# Patient Record
Sex: FEMALE | Race: BLACK OR AFRICAN AMERICAN
[De-identification: names, ages, dates, MRNs, and addresses within clinical notes are randomized per-mention and may not be internally consistent; named-entity substitution may affect disease eponyms.]

---

## 2017-10-05 ENCOUNTER — HOSPITAL ENCOUNTER (EMERGENCY)
Dept: HOSPITAL 18 - NAV ERS | Age: 37
Discharge: HOME | End: 2017-10-05
Payer: COMMERCIAL

## 2017-10-05 DIAGNOSIS — J06.9: Primary | ICD-10-CM

## 2017-10-05 DIAGNOSIS — F32.9: ICD-10-CM

## 2017-10-05 DIAGNOSIS — Z79.899: ICD-10-CM

## 2017-10-05 DIAGNOSIS — I10: ICD-10-CM

## 2017-10-05 DIAGNOSIS — E66.9: ICD-10-CM

## 2017-10-05 DIAGNOSIS — F41.9: ICD-10-CM

## 2017-10-05 PROCEDURE — 99283 EMERGENCY DEPT VISIT LOW MDM: CPT

## 2019-10-29 ENCOUNTER — HOSPITAL ENCOUNTER (OUTPATIENT)
Dept: HOSPITAL 92 - RAD | Age: 39
Discharge: HOME | End: 2019-10-29
Attending: OTOLARYNGOLOGY
Payer: COMMERCIAL

## 2019-10-29 DIAGNOSIS — R13.10: Primary | ICD-10-CM

## 2019-10-29 DIAGNOSIS — K44.9: ICD-10-CM

## 2019-10-29 DIAGNOSIS — K21.9: ICD-10-CM

## 2019-10-29 PROCEDURE — 74220 X-RAY XM ESOPHAGUS 1CNTRST: CPT

## 2019-10-29 NOTE — RAD
XR Barium Swallow Esophagus



History: Dysphagia



Comparison: None.



Findings: Patient was brought to the fluoroscopy suite. All questions were answered.



 radiograph of the chest is normal.



Initially the patient was given gas-forming crystals. Next thick liquid barium was given to continuou
sly drink. Again primary and secondary peristalsis was relatively slow with incomplete emptying. No

signs of mass effect or diverticulum.



No significant tertiary contractions. Small sliding hiatal hernia.



Next the patient swallowed a 13 mm tablet without stricture or narrowing.



Next the patient was put in the ALVAREZ position and given thin liquid barium to continuously drink. Smal
l sliding hiatal hernia. Reflux to the upper one third esophagus.



Impression: 

1. Poor primary and secondary peristalsis with incomplete emptying.

2. Small sliding hiatal hernia with reflux to the upper one third esophagus.



Reported By: Fabián Russell 

Electronically Signed:  10/29/2019 10:27 AM

## 2020-07-09 ENCOUNTER — HOSPITAL ENCOUNTER (EMERGENCY)
Dept: HOSPITAL 92 - ERS | Age: 40
Discharge: HOME | End: 2020-07-09
Payer: COMMERCIAL

## 2020-07-09 DIAGNOSIS — E11.9: ICD-10-CM

## 2020-07-09 DIAGNOSIS — Z79.84: ICD-10-CM

## 2020-07-09 DIAGNOSIS — F32.9: ICD-10-CM

## 2020-07-09 DIAGNOSIS — N83.202: Primary | ICD-10-CM

## 2020-07-09 DIAGNOSIS — F41.9: ICD-10-CM

## 2020-07-09 DIAGNOSIS — Z79.899: ICD-10-CM

## 2020-07-09 DIAGNOSIS — I10: ICD-10-CM

## 2020-07-09 LAB
ALBUMIN SERPL BCG-MCNC: 3.9 G/DL (ref 3.5–5)
ALP SERPL-CCNC: 62 U/L (ref 40–110)
ALT SERPL W P-5'-P-CCNC: 29 U/L (ref 8–55)
ANION GAP SERPL CALC-SCNC: 15 MMOL/L (ref 10–20)
AST SERPL-CCNC: 36 U/L (ref 5–34)
BASOPHILS # BLD AUTO: 0.1 THOU/UL (ref 0–0.2)
BASOPHILS NFR BLD AUTO: 0.7 % (ref 0–1)
BILIRUB SERPL-MCNC: 0.2 MG/DL (ref 0.2–1.2)
BUN SERPL-MCNC: 9 MG/DL (ref 7–18.7)
CALCIUM SERPL-MCNC: 9.2 MG/DL (ref 7.8–10.44)
CHLORIDE SERPL-SCNC: 102 MMOL/L (ref 98–107)
CO2 SERPL-SCNC: 21 MMOL/L (ref 22–29)
CREAT CL PREDICTED SERPL C-G-VRATE: 0 ML/MIN (ref 70–130)
EOSINOPHIL # BLD AUTO: 0.3 THOU/UL (ref 0–0.7)
EOSINOPHIL NFR BLD AUTO: 3.7 % (ref 0–10)
GLOBULIN SER CALC-MCNC: 3.3 G/DL (ref 2.4–3.5)
GLUCOSE SERPL-MCNC: 111 MG/DL (ref 70–105)
HGB BLD-MCNC: 11.8 G/DL (ref 12–16)
LYMPHOCYTES # BLD: 2.2 THOU/UL (ref 1.2–3.4)
LYMPHOCYTES NFR BLD AUTO: 26.4 % (ref 21–51)
MCH RBC QN AUTO: 26.7 PG (ref 27–31)
MCV RBC AUTO: 78.4 FL (ref 78–98)
MONOCYTES # BLD AUTO: 0.4 THOU/UL (ref 0.11–0.59)
MONOCYTES NFR BLD AUTO: 5.2 % (ref 0–10)
NEUTROPHILS # BLD AUTO: 5.3 THOU/UL (ref 1.4–6.5)
NEUTROPHILS NFR BLD AUTO: 64 % (ref 42–75)
PLATELET # BLD AUTO: 349 THOU/UL (ref 130–400)
POTASSIUM SERPL-SCNC: 4.1 MMOL/L (ref 3.5–5.1)
PREGS CONTROL BACKGROUND?: (no result)
PREGS CONTROL BAR APPEAR?: YES
RBC # BLD AUTO: 4.42 MILL/UL (ref 4.2–5.4)
SODIUM SERPL-SCNC: 134 MMOL/L (ref 136–145)
SP GR UR STRIP: 1.02 (ref 1–1.04)
WBC # BLD AUTO: 8.3 THOU/UL (ref 4.8–10.8)

## 2020-07-09 PROCEDURE — 80053 COMPREHEN METABOLIC PANEL: CPT

## 2020-07-09 PROCEDURE — 74177 CT ABD & PELVIS W/CONTRAST: CPT

## 2020-07-09 PROCEDURE — 84703 CHORIONIC GONADOTROPIN ASSAY: CPT

## 2020-07-09 PROCEDURE — 36415 COLL VENOUS BLD VENIPUNCTURE: CPT

## 2020-07-09 PROCEDURE — 81003 URINALYSIS AUTO W/O SCOPE: CPT

## 2020-07-09 PROCEDURE — 83605 ASSAY OF LACTIC ACID: CPT

## 2020-07-09 PROCEDURE — 85025 COMPLETE CBC W/AUTO DIFF WBC: CPT

## 2020-07-09 NOTE — CT
CT OF THE ABDOMEN AND PELVIS WITH IV CONTRAST



INDICATION: Right lower quadrant abdominal pain



COMPARISON: None



FINDINGS:





ABDOMEN:



Lung bases: Clear



Liver: Prominent fatty liver



Gallbladder:  Normal appearing.



Pancreas: Normal.



Adrenal glands: Normal.



Spleen: Normal.



Kidneys and ureters: Normal.  No hydronephrosis.



Vasculature: Normal.



Lymph nodes:No lymphadenopathy.



Free fluid in abdomen:No free fluid is evident.





PELVIS:



Small and large bowel: Normal



Appendix:Normal



Bladder: Normal.



Rectal and perirectal soft tissues:Normal.



Reproductive structures: There is a 3.3 cm cyst within the left ovary.



Free fluid in pelvis: No free fluid is evident.



Lymphadenopathy pelvis: No lymphadenopathy is evident.



Osseous structures: No acute osseous abnormality.  No destructive osteolytic or osteoblastic lesion i
s identified.



Soft tissues:Normal.



IMPRESSION:

1. 3.3 cm left adnexal cyst.

2. Prominent fatty liver



Reported By: David Jaramillo 

Electronically Signed:  7/9/2020 5:15 PM

## 2022-06-01 ENCOUNTER — HOSPITAL ENCOUNTER (OUTPATIENT)
Dept: HOSPITAL 92 - CSHMAMMO | Age: 42
Discharge: HOME | End: 2022-06-01
Attending: PHYSICIAN ASSISTANT
Payer: COMMERCIAL

## 2022-06-01 DIAGNOSIS — Z80.3: ICD-10-CM

## 2022-06-01 DIAGNOSIS — Z12.31: Primary | ICD-10-CM

## 2022-06-01 PROCEDURE — 77067 SCR MAMMO BI INCL CAD: CPT

## 2022-06-01 PROCEDURE — 77063 BREAST TOMOSYNTHESIS BI: CPT

## 2023-02-23 ENCOUNTER — HOSPITAL ENCOUNTER (OUTPATIENT)
Dept: HOSPITAL 92 - DTY/OP | Age: 43
Discharge: HOME | End: 2023-02-23
Attending: NURSE PRACTITIONER
Payer: COMMERCIAL

## 2023-02-23 DIAGNOSIS — E66.01: Primary | ICD-10-CM

## 2023-02-23 PROCEDURE — 97802 MEDICAL NUTRITION INDIV IN: CPT
